# Patient Record
Sex: MALE | Race: WHITE | Employment: FULL TIME | URBAN - METROPOLITAN AREA
[De-identification: names, ages, dates, MRNs, and addresses within clinical notes are randomized per-mention and may not be internally consistent; named-entity substitution may affect disease eponyms.]

---

## 2019-11-10 ENCOUNTER — ANCILLARY PROCEDURE (OUTPATIENT)
Dept: GENERAL RADIOLOGY | Facility: CLINIC | Age: 19
End: 2019-11-10
Attending: PHYSICIAN ASSISTANT
Payer: COMMERCIAL

## 2019-11-10 ENCOUNTER — OFFICE VISIT (OUTPATIENT)
Dept: URGENT CARE | Facility: URGENT CARE | Age: 19
End: 2019-11-10
Payer: COMMERCIAL

## 2019-11-10 VITALS
BODY MASS INDEX: 24.38 KG/M2 | HEIGHT: 72 IN | RESPIRATION RATE: 12 BRPM | HEART RATE: 80 BPM | DIASTOLIC BLOOD PRESSURE: 60 MMHG | TEMPERATURE: 98.7 F | WEIGHT: 180 LBS | SYSTOLIC BLOOD PRESSURE: 98 MMHG

## 2019-11-10 DIAGNOSIS — M25.562 ACUTE PAIN OF LEFT KNEE: ICD-10-CM

## 2019-11-10 DIAGNOSIS — M25.562 ACUTE PAIN OF LEFT KNEE: Primary | ICD-10-CM

## 2019-11-10 PROCEDURE — 73562 X-RAY EXAM OF KNEE 3: CPT | Mod: LT

## 2019-11-10 PROCEDURE — 99203 OFFICE O/P NEW LOW 30 MIN: CPT | Performed by: PHYSICIAN ASSISTANT

## 2019-11-10 ASSESSMENT — MIFFLIN-ST. JEOR: SCORE: 1864.47

## 2019-11-11 NOTE — PROGRESS NOTES
SUBJECTIVE:  Chief Complaint   Patient presents with     Urgent Care     Musculoskeletal Problem     left knee injury, getting worse after dancing.      Lee Adames is a 19 year old male who presents with a chief complaint of left knee pain.  Symptoms began 1 day(s) ago, are moderate and worsening.   Context:  Injury:No known injury. Patient was dancing this past weekend and he developed some pain while dancing. The pain has progressed into today. He notes that the pain worsens when he flexes his knee. Pain is located in the posterior lateral knee.  No pain with extension. He is not having calf pain.   Pain exacerbated by flexion. Relieved by rest.  He treated it initially with no therapy. This is the first time this type of injury has occurred to this patient.     No past medical history on file.  No current outpatient medications on file.     Social History     Tobacco Use     Smoking status: Never Smoker     Smokeless tobacco: Never Used   Substance Use Topics     Alcohol use: Not on file       ROS:  Review of systems negative except as stated above.    EXAM:   BP 98/60   Pulse 80   Temp 98.7  F (37.1  C) (Oral)   Resp 12   Ht 1.829 m (6')   Wt 81.6 kg (180 lb)   BMI 24.41 kg/m    M/S Exam:Left knee has TTP over posterior lateral knee. NO mass palpated.   GENERAL APPEARANCE: healthy, alert and no distress  EXTREMITIES: peripheral pulses normal. Negative homans sign.   SKIN: no suspicious lesions or rashes  NEURO: Normal strength and tone, sensory exam grossly normal, mentation intact and speech normal    X-RAY was done -- No acute abnormality    ASSESSMENT / PLAN:  1. Acute pain of left knee  DDX: Mensical tear , knee sprain, Popliteal artery entrapment, iliotibial band injury, DVT    Patient has TTP over lateral knee. Patient works as a professional dancer, likely overuse injury but possibly sprain or micro tear. Encouraged him to use knee sleeve, ice and avoid exacerbating activities such as dancing.    Follow-up with ortho in one week for recheck +/- advanced imaging  - XR Knee Left 3 Views; Future    Diagnosis and treatment plan was reviewed with patient and/or family.   We went over any labs or imaging.   Patient verbalizes understanding. All questions were addressed and answered.   Yancy Brown PA-C

## 2019-11-11 NOTE — PATIENT INSTRUCTIONS
Patient Education     Knee Pain with Uncertain Cause    There are several common causes for knee pain. These can include:    A sprain of the ligaments that support the joint    An injury to the cartilage lining of the joint    Arthritis from wear-and-tear or inflammation  There are other causes as well. There may also be swelling, reduced movement of the knee joint, and pain with walking. A definite diagnosis will still need to be made. If your symptoms don't improve, further follow-up and testing may be needed.  Home care    Stay off the injured leg as much as possible until pain improves.    Apply an ice pack over the injured area for 15 to 20 minutes every 3 to 6 hours. You should do this for the first 24 to 48 hours. You can make an ice pack by filling a plastic bag that seals at the top with ice cubes and then wrapping it with a thin towel. Continue to use ice packs for relief of pain and swelling as needed. As the ice melts, be careful not to get your wrap, splint, or cast wet. After 48 hours, apply heat (warm shower or warm bath) for 15 to 20 minutes several times a day, or alternate ice and heat. If you have to wear a hook-and-loop knee brace, you can open it to apply the ice pack, or heat, directly to the knee. Never put ice directly on the skin. Always wrap the ice in a towel or other type of cloth.    You may use over-the-counter pain medicine to control pain, unless another pain medicine was prescribed. If you have chronic liver or kidney disease or ever had a stomach ulcer or gastrointestinal bleeding, talk with your healthcare provider before using these medicines.    If crutches or a walker have been recommended, don't put weight on the injured leg until you can do so without pain. Check with your healthcare provider before returning to sports or full work duties.    If you have a hook-and-loop knee brace, you can remove it to bathe and sleep, unless told otherwise.  Follow-up care  Follow up with  your healthcare provider as advised. This is usually within 1 to 2 weeks.  If X-rays were taken, you will be told of any new findings that may affect your care  Call 911  Call 911 if you have:    Shortness of breath    Chest pain  When to seek medical advice  Call your healthcare provider right away if any of these occur:    Toes or foot becomes swollen, cold, blue, numb, or tingly    Pain or swelling spreads over the knee or calf    Warmth or redness appears over the knee or calf    Other joints become painful    Rash appears    Fever of 100.4 F (38 C) or higher, or as directed by your healthcare provider    [employer]  Date Last Reviewed: 5/1/2018 2000-2018 [employer]. [address] [place] [address], [place] [address]. All rights reserved. This information is not intended as a substitute for professional medical care. Always follow your healthcare professional's instructions.